# Patient Record
Sex: FEMALE | URBAN - METROPOLITAN AREA
[De-identification: names, ages, dates, MRNs, and addresses within clinical notes are randomized per-mention and may not be internally consistent; named-entity substitution may affect disease eponyms.]

---

## 2019-09-20 ENCOUNTER — APPOINTMENT (OUTPATIENT)
Dept: URBAN - METROPOLITAN AREA CLINIC 236 | Age: 39
Setting detail: DERMATOLOGY
End: 2019-09-21

## 2019-09-20 DIAGNOSIS — B02.9 ZOSTER WITHOUT COMPLICATIONS: ICD-10-CM

## 2019-09-20 PROCEDURE — 99202 OFFICE O/P NEW SF 15 MIN: CPT

## 2019-09-20 PROCEDURE — OTHER COUNSELING: OTHER

## 2019-09-20 PROCEDURE — OTHER ADDITIONAL NOTES: OTHER

## 2019-09-20 PROCEDURE — OTHER TREATMENT REGIMEN: OTHER

## 2019-09-20 ASSESSMENT — LOCATION DETAILED DESCRIPTION DERM: LOCATION DETAILED: LEFT SUPERIOR CENTRAL MALAR CHEEK

## 2019-09-20 ASSESSMENT — PAIN INTENSITY VAS: HOW INTENSE IS YOUR PAIN 0 BEING NO PAIN, 10 BEING THE MOST SEVERE PAIN POSSIBLE?: 3/10 PAIN

## 2019-09-20 ASSESSMENT — LOCATION ZONE DERM: LOCATION ZONE: FACE

## 2019-09-20 ASSESSMENT — LOCATION SIMPLE DESCRIPTION DERM: LOCATION SIMPLE: LEFT CHEEK

## 2019-09-20 NOTE — PROCEDURE: ADDITIONAL NOTES
Additional Notes: No Eye or ear pain, patient has already seen eye doctor for vision evaluation, recommend ENT.\\nRecommend patient go to ER due to complaints of back and neck stiffness and history of viral meningitis secondary to zoster.
Detail Level: Simple

## 2019-09-20 NOTE — PROCEDURE: COUNSELING
Patient Specific Counseling (Will Not Stick From Patient To Patient): Patient is advised to go to ER due to symptoms of meningitis.
Detail Level: Detailed

## 2019-09-20 NOTE — PROCEDURE: TREATMENT REGIMEN
Continue Regimen: Valtrex 1mg three times a day as prescribed by physician at [facility] Continue Regimen: Valtrex 1mg three times a day as prescribed by physician at [facility]